# Patient Record
Sex: MALE | Employment: FULL TIME | URBAN - NONMETROPOLITAN AREA
[De-identification: names, ages, dates, MRNs, and addresses within clinical notes are randomized per-mention and may not be internally consistent; named-entity substitution may affect disease eponyms.]

---

## 2017-05-12 ENCOUNTER — OFFICE VISIT (OUTPATIENT)
Dept: URGENT CARE | Facility: PHYSICIAN GROUP | Age: 32
End: 2017-05-12
Payer: MEDICAID

## 2017-05-12 VITALS
OXYGEN SATURATION: 97 % | SYSTOLIC BLOOD PRESSURE: 120 MMHG | HEIGHT: 71 IN | RESPIRATION RATE: 14 BRPM | BODY MASS INDEX: 27.58 KG/M2 | HEART RATE: 68 BPM | TEMPERATURE: 98.4 F | WEIGHT: 197 LBS | DIASTOLIC BLOOD PRESSURE: 72 MMHG

## 2017-05-12 DIAGNOSIS — J31.0 RHINITIS, UNSPECIFIED TYPE: ICD-10-CM

## 2017-05-12 DIAGNOSIS — J06.9 URI WITH COUGH AND CONGESTION: ICD-10-CM

## 2017-05-12 DIAGNOSIS — H66.001 ACUTE SUPPURATIVE OTITIS MEDIA OF RIGHT EAR WITHOUT SPONTANEOUS RUPTURE OF TYMPANIC MEMBRANE, RECURRENCE NOT SPECIFIED: ICD-10-CM

## 2017-05-12 PROCEDURE — 99203 OFFICE O/P NEW LOW 30 MIN: CPT | Performed by: PHYSICIAN ASSISTANT

## 2017-05-12 RX ORDER — FLUTICASONE PROPIONATE 50 MCG
1 SPRAY, SUSPENSION (ML) NASAL 2 TIMES DAILY
Qty: 1 BOTTLE | Refills: 0 | Status: SHIPPED | OUTPATIENT
Start: 2017-05-12 | End: 2019-02-21

## 2017-05-12 RX ORDER — CODEINE PHOSPHATE AND GUAIFENESIN 10; 100 MG/5ML; MG/5ML
5 SOLUTION ORAL NIGHTLY PRN
Qty: 120 ML | Refills: 0 | Status: SHIPPED | OUTPATIENT
Start: 2017-05-12 | End: 2019-02-21

## 2017-05-12 RX ORDER — AMOXICILLIN AND CLAVULANATE POTASSIUM 875; 125 MG/1; MG/1
1 TABLET, FILM COATED ORAL 2 TIMES DAILY
Qty: 20 TAB | Refills: 0 | Status: SHIPPED | OUTPATIENT
Start: 2017-05-12 | End: 2019-02-21

## 2017-05-12 NOTE — PROGRESS NOTES
Chief Complaint   Patient presents with   • Ear Fullness     dizzy, cant hear       HISTORY OF PRESENT ILLNESS: Patient is a 32 y.o. male who presents today for evaluation of right ear pain. Patient reports associated intermittent dizziness and difficulty hearing out of that ear. It started 2-3 days ago. It has become progressively worse. He has not taken any of her medication. He denies any drainage. He reports a four-day history of green nasal drainage and cough. He has a difficulty sleeping at night because of the cough. He denies fever, night sweats, chills, body aches.    There are no active problems to display for this patient.      Allergies:Review of patient's allergies indicates not on file.    Current Outpatient Prescriptions Ordered in [facility]   Medication Sig Dispense Refill   • amoxicillin-clavulanate (AUGMENTIN) 875-125 MG Tab Take 1 Tab by mouth 2 times a day. 20 Tab 0   • guaifenesin-codeine (ROBITUSSIN AC) Solution oral solution Take 5 mL by mouth at bedtime as needed for Cough. 120 mL 0   • fluticasone (FLONASE) 50 MCG/ACT nasal spray Spray 1 Spray in nose 2 times a day. 1 Bottle 0     No current Epic-ordered facility-administered medications on file.       No past medical history on file.    Social History   Substance Use Topics   • Smoking status: Not on file   • Smokeless tobacco: Not on file   • Alcohol Use: Not on file       No family status information on file.   No family history on file.    ROS:   Review of Systems   Constitutional: Negative for fever, chills, weight loss and malaise/fatigue.   HENT: Negative for nosebleeds,  sore throat and neck pain.    Eyes: Negative for blurred vision.   Respiratory: Negative for sputum production, shortness of breath and wheezing.    Cardiovascular: Negative for chest pain, palpitations, orthopnea and leg swelling.   Gastrointestinal: Negative for heartburn, nausea, vomiting and abdominal pain.   Genitourinary: Negative for dysuria, urgency and frequency.  "      Exam:  Blood pressure 120/72, pulse 68, temperature 36.9 °C (98.4 °F), resp. rate 14, height 1.803 m (5' 10.98\"), weight 89.359 kg (197 lb), SpO2 97 %.  General: Normal appearing. No distress.  HEENT: Conjunctiva clear, lids without ptosis, ears normal shape and contour, nasal mucosa benign, oropharynx is without erythema, edema or exudates. Left EAC and TM are within normal limits. Right EAC is within normal limits. Right TM is markedly erythematous with purulent effusion. Unable to identify landmarks.  Pulmonary: Clear to ausculation and percussion.  Normal effort. No rales, ronchi, or wheezing.   Cardiovascular: Regular rate and rhythm without murmur.   Neurologic: Grossly nonfocal.  Lymph: No cervical lymphadenopathy noted.  Skin: No obvious lesions.  Psych: Normal mood. Alert and oriented x3. Judgment and insight is normal.    Assessment/Plan:  Take all medication as directed. Discussed the nasal congestion and cough are likely due to a virus and one likely respond antibiotics. Discussed appropriate over-the-counter symptomatic medication, and when to return to clinic. Follow for similar persistent symptoms. Patient is requesting off work until the 15th.  1. Acute suppurative otitis media of right ear without spontaneous rupture of tympanic membrane, recurrence not specified  amoxicillin-clavulanate (AUGMENTIN) 875-125 MG Tab   2. URI with cough and congestion  guaifenesin-codeine (ROBITUSSIN AC) Solution oral solution   3. Rhinitis, unspecified type  fluticasone (FLONASE) 50 MCG/ACT nasal spray         "

## 2017-05-12 NOTE — Clinical Note
May 12, 2017         Patient: Mingo Brown   YOB: 1985   Date of Visit: 5/12/2017           To Whom it May Concern:    Mingo Brown was seen in my clinic on 5/12/2017. He may return to work on 5/15/17.    If you have any questions or concerns, please don't hesitate to call.        Sincerely,           Estrellita Burgess PA-C  Electronically Signed

## 2017-05-12 NOTE — MR AVS SNAPSHOT
"        Mingo Walker Stephanie   2017 10:20 AM   Office Visit   MRN: [id number]    Department:  [facility]   Dept Phone:  [phone]    Description:  Male : 1985   Provider:  Estrellita Burgess PA-C           Reason for Visit     Ear Fullness dizzy, cant hear      Allergies as of 2017     Not on File      You were diagnosed with     Acute suppurative otitis media of right ear without spontaneous rupture of tympanic membrane, recurrence not specified   [[id number]]       URI with cough and congestion   [[id number]]       Rhinitis, unspecified type   [[id number]]         Vital Signs     Blood Pressure Pulse Temperature Respirations Height Weight    120/72 mmHg 68 36.9 °C (98.4 °F) 14 1.803 m (5' 10.98\") 89.359 kg (197 lb)    Body Mass Index Oxygen Saturation                27.49 kg/m2 97%          Basic Information     Date Of Birth Sex Race Ethnicity Preferred Language    1985 Male Unable to Obtain Unknown English      Health Maintenance     Patient has no pending health maintenance at this time      Current Immunizations     No immunizations on file.      Below and/or attached are the medications your provider expects you to take. Review all of your home medications and newly ordered medications with your provider and/or pharmacist. Follow medication instructions as directed by your provider and/or pharmacist. Please keep your medication list with you and share with your provider. Update the information when medications are discontinued, doses are changed, or new medications (including over-the-counter products) are added; and carry medication information at all times in the event of emergency situations     Allergies:  No Known Allergies          Medications  Valid as of: May 12, 2017 - 11:04 AM    Generic Name Brand Name Tablet Size Instructions for use    Amoxicillin-Pot Clavulanate (Tab) AUGMENTIN 875-125 MG Take 1 Tab by mouth 2 times a day.        Fluticasone Propionate (Suspension) " FLONASE 50 MCG/ACT Spray 1 Spray in nose 2 times a day.        Guaifenesin-Codeine (Solution) ROBITUSSIN -10 mg/5mL Take 5 mL by mouth at bedtime as needed for Cough.        .                 Medicines prescribed today were sent to:     [facility] JOSSELYN CRUZ - [address]    [address] [address]    Phone: [phone] Fax: [phone]    Open 24 Hours?: No      Medication refill instructions:       If your prescription bottle indicates you have medication refills left, it is not necessary to call your provider’s office. Please contact your pharmacy and they will refill your medication.    If your prescription bottle indicates you do not have any refills left, you may request refills at any time through one of the following ways: The online [employer] system (except Urgent Care), by calling your provider’s office, or by asking your pharmacy to contact your provider’s office with a refill request. Medication refills are processed only during regular business hours and may not be available until the next business day. Your provider may request additional information or to have a follow-up visit with you prior to refilling your medication.   *Please Note: Medication refills are assigned a new Rx number when refilled electronically. Your pharmacy may indicate that no refills were authorized even though a new prescription for the same medication is available at the pharmacy. Please request the medicine by name with the pharmacy before contacting your provider for a refill.           [employer] Access Code: [email or web address]  Expires: 6/11/2017 11:04 AM    Your email address is not on file at [facility].  Email Addresses are required for you to sign up for [employer], please contact [phone] to verify your personal information and to provide your email address prior to attempting to register for [employer].    Mingo Brown  [address] [place]  [address]    Numerous  A secure, online tool to manage your health information     [email or web address]’s Megathreadt® is a secure, online tool that connects you to your personalized health information from the privacy of your home -- day or night - making it very easy for you to manage your healthcare. Once the activation process is completed, you can even access your medical information using the Numerous prachi, which is available for free in the [employer] store or Google Play store.     To learn more about Numerous, visit [email or web address]    There are two levels of access available (as shown below):   My Chart Features  [facility] Primary Care Doctor [facility]  Specialists [facility]  Urgent  Care Non-[facility] Primary Care Doctor   Email your healthcare team securely and privately 24/7 X X X    Manage appointments: schedule your next appointment; view details of past/upcoming appointments X      Request prescription refills. X      View recent personal medical records, including lab and immunizations X X X X   View health record, including health history, allergies, medications X X X X   Read reports about your outpatient visits, procedures, consult and ER notes X X X X   See your discharge summary, which is a recap of your hospital and/or ER visit that includes your diagnosis, lab results, and care plan X X  X     How to register for Numerous:  Once your e-mail address has been verified, follow the following steps to sign up for Numerous.     1. Go to  [email or web address]  2. Click on the Sign Up Now box, which takes you to the New Member Sign Up page. You will need to provide the following information:  a. Enter your Numerous Access Code exactly as it appears at the top of this page. (You will not need to use this code after you’ve completed the sign-up process. If you do not sign up before the expiration date, you must request a new code.)   b. Enter your date of birth.   c. Enter your home email address.   d. Click Submit, and  follow the next screen’s instructions.  3. Create a ChangeTipt ID. This will be your [employer] login ID and cannot be changed, so think of one that is secure and easy to remember.  4. Create a ChangeTipt password. You can change your password at any time.  5. Enter your Password Reset Question and Answer. This can be used at a later time if you forget your password.   6. Enter your e-mail address. This allows you to receive e-mail notifications when new information is available in [employer].  7. Click Sign Up. You can now view your health information.    For assistance activating your [employer] account, call [phone]

## 2017-07-12 ENCOUNTER — OFFICE VISIT (OUTPATIENT)
Dept: URGENT CARE | Facility: PHYSICIAN GROUP | Age: 32
End: 2017-07-12
Payer: MEDICAID

## 2017-07-12 VITALS
WEIGHT: 206 LBS | SYSTOLIC BLOOD PRESSURE: 124 MMHG | HEART RATE: 76 BPM | DIASTOLIC BLOOD PRESSURE: 80 MMHG | TEMPERATURE: 97.9 F | BODY MASS INDEX: 28.84 KG/M2 | RESPIRATION RATE: 16 BRPM | OXYGEN SATURATION: 98 % | HEIGHT: 71 IN

## 2017-07-12 DIAGNOSIS — S39.012A STRAIN OF LUMBAR PARASPINOUS MUSCLE, INITIAL ENCOUNTER: ICD-10-CM

## 2017-07-12 PROCEDURE — 99214 OFFICE O/P EST MOD 30 MIN: CPT | Performed by: PHYSICIAN ASSISTANT

## 2017-07-12 RX ORDER — CYCLOBENZAPRINE HCL 10 MG
10 TABLET ORAL 3 TIMES DAILY PRN
Qty: 30 TAB | Refills: 0 | Status: SHIPPED | OUTPATIENT
Start: 2017-07-12 | End: 2019-02-21

## 2017-07-12 NOTE — MR AVS SNAPSHOT
"        Mingo Burgosnis   2017 6:10 PM   Office Visit   MRN: [id number]    Department:  [facility]   Dept Phone:  [phone]    Description:  Male : 1985   Provider:  Estrellita Burgess PA-C           Reason for Visit     Back Pain           Allergies as of 2017     No Known Allergies      You were diagnosed with     Strain of lumbar paraspinous muscle, initial encounter   [[id number]]         Vital Signs     Blood Pressure Pulse Temperature Respirations Height Weight    124/80 mmHg 76 36.6 °C (97.9 °F) 16 1.803 m (5' 10.98\") 93.441 kg (206 lb)    Body Mass Index Oxygen Saturation                28.74 kg/m2 98%          Basic Information     Date Of Birth Sex Race Ethnicity Preferred Language    1985 Male Unable to Obtain Unknown English      Health Maintenance        Date Due Completion Dates    IMM DTaP/Tdap/Td Vaccine (1 - Tdap) 2003 ---    IMM INFLUENZA (1) 2017 ---            Current Immunizations     No immunizations on file.      Below and/or attached are the medications your provider expects you to take. Review all of your home medications and newly ordered medications with your provider and/or pharmacist. Follow medication instructions as directed by your provider and/or pharmacist. Please keep your medication list with you and share with your provider. Update the information when medications are discontinued, doses are changed, or new medications (including over-the-counter products) are added; and carry medication information at all times in the event of emergency situations     Allergies:  No Known Allergies          Medications  Valid as of: 2017 -  6:56 PM    Generic Name Brand Name Tablet Size Instructions for use    Amoxicillin-Pot Clavulanate (Tab) AUGMENTIN 875-125 MG Take 1 Tab by mouth 2 times a day.        Cyclobenzaprine HCl (Tab) FLEXERIL 10 MG Take 1 Tab by mouth 3 times a day as needed for Mild Pain or Moderate Pain.        Fluticasone " Propionate (Suspension) FLONASE 50 MCG/ACT Spray 1 Spray in nose 2 times a day.        Guaifenesin-Codeine (Solution) ROBITUSSIN -10 mg/5mL Take 5 mL by mouth at bedtime as needed for Cough.        .                 Medicines prescribed today were sent to:     [facility] [address] [place] [address], [place] - [address]    [address] [facility] [place] [address]    Phone: [phone] Fax: [phone]    Open 24 Hours?: No      Medication refill instructions:       If your prescription bottle indicates you have medication refills left, it is not necessary to call your provider’s office. Please contact your pharmacy and they will refill your medication.    If your prescription bottle indicates you do not have any refills left, you may request refills at any time through one of the following ways: The online [employer] system (except Urgent Care), by calling your provider’s office, or by asking your pharmacy to contact your provider’s office with a refill request. Medication refills are processed only during regular business hours and may not be available until the next business day. Your provider may request additional information or to have a follow-up visit with you prior to refilling your medication.   *Please Note: Medication refills are assigned a new Rx number when refilled electronically. Your pharmacy may indicate that no refills were authorized even though a new prescription for the same medication is available at the pharmacy. Please request the medicine by name with the pharmacy before contacting your provider for a refill.           [place] Status: Patient Declined

## 2017-07-12 NOTE — Clinical Note
July 12, 2017         Patient: Mingo Brown   YOB: 1985   Date of Visit: 7/12/2017           To Whom it May Concern:    Mingo Brown was seen in my clinic on 7/12/2017. He may return to work on 7/14/17.    If you have any questions or concerns, please don't hesitate to call.        Sincerely,           Estrellita Burgess PA-C  Electronically Signed

## 2017-07-13 NOTE — PROGRESS NOTES
Chief Complaint   Patient presents with   • Back Pain       HISTORY OF PRESENT ILLNESS: Patient is a 32 y.o. male who presents today for evaluation of right-sided low back pain that started today. Patient was helping his dad demo a kitchen. He was pulling on some countertop expecting his feet dependent but they did not. He felt immediate pain in his right low back, radiating down to his right buttock and hip. He states the pain is worse when he goes from a seated to standing position when he tries to stand back up straight. He complains of pain radiating down his right buttock to approximately mid thigh. He denies saddle anesthesia and lower extremity weakness. He has not had any bowel or bladder incontinence. He denies any history of the same symptoms. He has not taken any over-the-counter medication as it just happened.    There are no active problems to display for this patient.      Allergies:Review of patient's allergies indicates no known allergies.    Current Outpatient Prescriptions Ordered in [facility]   Medication Sig Dispense Refill   • cyclobenzaprine (FLEXERIL) 10 MG Tab Take 1 Tab by mouth 3 times a day as needed for Mild Pain or Moderate Pain. 30 Tab 0   • amoxicillin-clavulanate (AUGMENTIN) 875-125 MG Tab Take 1 Tab by mouth 2 times a day. 20 Tab 0   • guaifenesin-codeine (ROBITUSSIN AC) Solution oral solution Take 5 mL by mouth at bedtime as needed for Cough. 120 mL 0   • fluticasone (FLONASE) 50 MCG/ACT nasal spray Spray 1 Spray in nose 2 times a day. 1 Bottle 0     No current Epic-ordered facility-administered medications on file.       No past medical history on file.    Social History   Substance Use Topics   • Smoking status: Not on file   • Smokeless tobacco: Not on file   • Alcohol Use: Not on file       No family status information on file.   No family history on file.    ROS:   Review of Systems   Constitutional: Negative for fever, chills, weight loss and malaise/fatigue.   HENT: Negative for  "ear pain, nosebleeds, congestion, sore throat and neck pain.    Eyes: Negative for blurred vision.   Respiratory: Negative for cough, sputum production, shortness of breath and wheezing.    Cardiovascular: Negative for chest pain, palpitations, orthopnea and leg swelling.   Gastrointestinal: Negative for heartburn, nausea, vomiting and abdominal pain.   Genitourinary: Negative for dysuria, urgency and frequency.       Exam:  Blood pressure 124/80, pulse 76, temperature 36.6 °C (97.9 °F), resp. rate 16, height 1.803 m (5' 10.98\"), weight 93.441 kg (206 lb), SpO2 98 %.  General: Normal appearing. No distress.  HEENT: Head is grossly normal.  Pulmonary: No respiratory distress noted.  Back: Decreased range of motion due to pain. No localized tenderness noted.  Neurologic: No sensory deficit noted.  Extremities: No motor deficit noted. Prepatellar DTRs are strong and equal bilaterally.  Skin: No obvious lesions.  Psych: Normal mood. Alert and oriented x3. Judgment and insight is normal.    Assessment/Plan:  Take all medication as directed. Apply heat to the affected area. Discussed over-the-counter muscle rubs and pain patches. Follow-up for worsening or persistent symptoms.  1. Strain of lumbar paraspinous muscle, initial encounter  cyclobenzaprine (FLEXERIL) 10 MG Tab         "

## 2018-12-04 ENCOUNTER — OFFICE VISIT (OUTPATIENT)
Dept: URGENT CARE | Facility: PHYSICIAN GROUP | Age: 33
End: 2018-12-04

## 2018-12-04 VITALS
DIASTOLIC BLOOD PRESSURE: 70 MMHG | WEIGHT: 199 LBS | HEIGHT: 71 IN | BODY MASS INDEX: 27.86 KG/M2 | OXYGEN SATURATION: 97 % | SYSTOLIC BLOOD PRESSURE: 122 MMHG | HEART RATE: 71 BPM | TEMPERATURE: 97.5 F

## 2018-12-04 DIAGNOSIS — S05.8X2A CORNEAL INJURY OF LEFT EYE, INITIAL ENCOUNTER: ICD-10-CM

## 2018-12-04 PROCEDURE — 99214 OFFICE O/P EST MOD 30 MIN: CPT | Performed by: PHYSICIAN ASSISTANT

## 2018-12-04 NOTE — LETTER
December 4, 2018         Patient: Mingo Brown   YOB: 1985   Date of Visit: 12/4/2018           To Whom it May Concern:    Mingo Brown was seen in my clinic on 12/4/2018.  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 He will follow up with optometry on 12/10/18 for full clearance.     If you have any questions or concerns, please don't hesitate to call.        Sincerely,           Camille Serrano P.A.-C.  Electronically Signed

## 2018-12-07 ASSESSMENT — ENCOUNTER SYMPTOMS
NAUSEA: 0
FOREIGN BODY SENSATION: 0
VOMITING: 0
DIARRHEA: 0
SHORTNESS OF BREATH: 0
CHILLS: 0
EYE DISCHARGE: 0
FEVER: 0
BLURRED VISION: 0
ABDOMINAL PAIN: 0
DIZZINESS: 0
PHOTOPHOBIA: 1
DOUBLE VISION: 0
EYE PAIN: 1
MUSCULOSKELETAL NEGATIVE: 1
EYE REDNESS: 1
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
## 2019-02-21 ENCOUNTER — OFFICE VISIT (OUTPATIENT)
Dept: URGENT CARE | Facility: PHYSICIAN GROUP | Age: 34
End: 2019-02-21
Payer: COMMERCIAL

## 2019-02-21 ENCOUNTER — APPOINTMENT (OUTPATIENT)
Dept: RADIOLOGY | Facility: IMAGING CENTER | Age: 34
End: 2019-02-21
Attending: PHYSICIAN ASSISTANT
Payer: COMMERCIAL

## 2019-02-21 VITALS
SYSTOLIC BLOOD PRESSURE: 132 MMHG | OXYGEN SATURATION: 98 % | TEMPERATURE: 98.3 F | HEART RATE: 70 BPM | DIASTOLIC BLOOD PRESSURE: 80 MMHG | RESPIRATION RATE: 16 BRPM | BODY MASS INDEX: 26.36 KG/M2 | WEIGHT: 189 LBS
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
## 2019-02-21 DIAGNOSIS — S39.012A ACUTE MYOFASCIAL STRAIN OF LUMBOSACRAL REGION, INITIAL ENCOUNTER: ICD-10-CM

## 2019-02-21 DIAGNOSIS — S29.019A THORACIC MYOFASCIAL STRAIN, INITIAL ENCOUNTER: ICD-10-CM

## 2019-02-21 DIAGNOSIS — R07.81 RIB PAIN ON RIGHT SIDE: ICD-10-CM

## 2019-02-21 DIAGNOSIS — S13.4XXA WHIPLASH INJURIES, INITIAL ENCOUNTER: ICD-10-CM

## 2019-02-21 DIAGNOSIS — S13.4XXA WHIPLASH INJURIES, INITIAL ENCOUNTER: Primary | ICD-10-CM

## 2019-02-21 PROCEDURE — 71101 X-RAY EXAM UNILAT RIBS/CHEST: CPT | Mod: TC,FY,RT | Performed by: PHYSICIAN ASSISTANT

## 2019-02-21 PROCEDURE — 72070 X-RAY EXAM THORAC SPINE 2VWS: CPT | Mod: 26 | Performed by: PHYSICIAN ASSISTANT

## 2019-02-21 PROCEDURE — 72100 X-RAY EXAM L-S SPINE 2/3 VWS: CPT | Mod: TC,FY | Performed by: PHYSICIAN ASSISTANT

## 2019-02-21 PROCEDURE — 99214 OFFICE O/P EST MOD 30 MIN: CPT | Performed by: PHYSICIAN ASSISTANT

## 2019-02-21 RX ORDER — TRAMADOL HYDROCHLORIDE 50 MG/1
50-100 TABLET ORAL EVERY 4 HOURS PRN
Qty: 30 TAB | Refills: 0 | Status: SHIPPED | OUTPATIENT
Start: 2019-02-21 | End: 2019-02-26

## 2019-02-21 RX ORDER — CYCLOBENZAPRINE HCL 5 MG
5-10 TABLET ORAL 3 TIMES DAILY PRN
Qty: 30 TAB | Refills: 0 | Status: SHIPPED | OUTPATIENT
Start: 2019-02-21 | End: 2019-09-06

## 2019-02-21 RX ORDER — METHYLPREDNISOLONE 4 MG/1
TABLET ORAL
Qty: 21 TAB | Refills: 0 | Status: SHIPPED | OUTPATIENT
Start: 2019-02-21 | End: 2019-09-06

## 2019-02-21 NOTE — LETTER
February 21, 2019       Patient: Mingo Brown   YOB: 1985   Date of Visit: 2/21/2019         To Whom It May Concern:    It is my medical opinion that Mingo Brown may be excused from work for the dates of 2/21/19-2/25/19.      If you have any questions or concerns, please don't hesitate to call [phone]          Sincerely,          Quintin Jenkins P.A.-C.  Electronically Signed
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
## 2019-02-26 ENCOUNTER — OFFICE VISIT (OUTPATIENT)
Dept: URGENT CARE | Facility: PHYSICIAN GROUP | Age: 34
End: 2019-02-26
Payer: COMMERCIAL

## 2019-02-26 VITALS
TEMPERATURE: 98.2 F | DIASTOLIC BLOOD PRESSURE: 70 MMHG | RESPIRATION RATE: 16 BRPM | OXYGEN SATURATION: 94 % | SYSTOLIC BLOOD PRESSURE: 124 MMHG | WEIGHT: 189 LBS | HEART RATE: 55 BPM | BODY MASS INDEX: 26.36 KG/M2

## 2019-02-26 DIAGNOSIS — S39.012D ACUTE MYOFASCIAL STRAIN OF LUMBOSACRAL REGION, SUBSEQUENT ENCOUNTER: ICD-10-CM

## 2019-02-26 DIAGNOSIS — R51.9 NONINTRACTABLE HEADACHE, UNSPECIFIED CHRONICITY PATTERN, UNSPECIFIED HEADACHE TYPE: ICD-10-CM

## 2019-02-26 DIAGNOSIS — S13.4XXD WHIPLASH INJURIES, SUBSEQUENT ENCOUNTER: ICD-10-CM
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
## 2019-02-26 DIAGNOSIS — S29.019D THORACIC MYOFASCIAL STRAIN, SUBSEQUENT ENCOUNTER: ICD-10-CM

## 2019-02-26 PROCEDURE — 99214 OFFICE O/P EST MOD 30 MIN: CPT | Performed by: FAMILY MEDICINE

## 2019-02-26 RX ORDER — AMOXICILLIN 500 MG/1
CAPSULE ORAL
COMMUNITY
Start: 2019-01-16 | End: 2019-09-06

## 2019-02-26 RX ORDER — KETOROLAC TROMETHAMINE 10 MG/1
TABLET, FILM COATED ORAL
Qty: 20 TAB | Refills: 0 | Status: SHIPPED | OUTPATIENT
Start: 2019-02-26 | End: 2019-09-06
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
## 2019-02-26 NOTE — LETTER
February 26, 2019         Patient: Mingo Brown   YOB: 1985   Date of Visit: 2/26/2019           To Whom it May Concern:    Mingo Brown was seen in my clinic on 2/26/2019.     Please excuse from work for 2/26/19 thru and including 3/2/19 due to medical condition.    May return sooner if feeling better.    Please allow him to do light duty upon return to work if needed.    If you have any questions or concerns, please don't hesitate to call.        Sincerely,           Jose Altamirano M.D.  Electronically Signed

## 2019-04-04 ENCOUNTER — TELEPHONE (OUTPATIENT)
Dept: SCHEDULING | Facility: IMAGING CENTER | Age: 34
End: 2019-04-04

## 2019-08-23 ENCOUNTER — TELEPHONE (OUTPATIENT)
Dept: SCHEDULING | Facility: IMAGING CENTER | Age: 34
End: 2019-08-23

## 2019-09-06 ENCOUNTER — OFFICE VISIT (OUTPATIENT)
Dept: MEDICAL GROUP | Facility: CLINIC | Age: 34
End: 2019-09-06
Payer: COMMERCIAL

## 2019-09-06 VITALS
OXYGEN SATURATION: 95 % | HEART RATE: 69 BPM | BODY MASS INDEX: 27.77 KG/M2 | RESPIRATION RATE: 14 BRPM | SYSTOLIC BLOOD PRESSURE: 146 MMHG | TEMPERATURE: 98.2 F | DIASTOLIC BLOOD PRESSURE: 90 MMHG | WEIGHT: 198.4 LBS | HEIGHT: 71 IN

## 2019-09-06 DIAGNOSIS — R06.02 SHORTNESS OF BREATH: ICD-10-CM

## 2019-09-06 DIAGNOSIS — L72.0 CYST OF SKIN AND SUBCUTANEOUS TISSUE: ICD-10-CM

## 2019-09-06 PROCEDURE — 11401 EXC TR-EXT B9+MARG 0.6-1 CM: CPT | Performed by: FAMILY MEDICINE

## 2019-09-06 PROCEDURE — 99213 OFFICE O/P EST LOW 20 MIN: CPT | Mod: 25 | Performed by: FAMILY MEDICINE

## 2019-09-06 RX ORDER — HYDROCODONE BITARTRATE AND ACETAMINOPHEN 10; 325 MG/1; MG/1
1 TABLET ORAL EVERY 6 HOURS PRN
Refills: 0 | COMMUNITY
Start: 2019-06-27 | End: 2019-09-06

## 2019-09-06 RX ORDER — IBUPROFEN 800 MG/1
800 TABLET ORAL EVERY 6 HOURS PRN
Refills: 0 | COMMUNITY
Start: 2019-06-27 | End: 2019-09-06

## 2019-09-06 SDOH — HEALTH STABILITY: MENTAL HEALTH: HOW MANY STANDARD DRINKS CONTAINING ALCOHOL DO YOU HAVE ON A TYPICAL DAY?: 1 OR 2

## 2019-09-06 SDOH — HEALTH STABILITY: MENTAL HEALTH: HOW OFTEN DO YOU HAVE 6 OR MORE DRINKS ON ONE OCCASION?: NEVER

## 2019-09-06 SDOH — HEALTH STABILITY: MENTAL HEALTH: HOW OFTEN DO YOU HAVE A DRINK CONTAINING ALCOHOL?: NEVER

## 2019-09-06 ASSESSMENT — PATIENT HEALTH QUESTIONNAIRE - PHQ9: CLINICAL INTERPRETATION OF PHQ2 SCORE: 0

## 2019-09-06 NOTE — ASSESSMENT & PLAN NOTE
Has been experiencing shortness of breath at different occasion: at rest, during exercise and at rest. Cough. Chest tightness. Gave up vaping after [place] pneumonitis scare. Has smoked since he was 15. Denies wheezing. Sxs more when he is worried, anxious.

## 2019-09-06 NOTE — PROGRESS NOTES
Complaint: Establish care     Subjective:     Mingo Brown is a 34 y.o. male here today accompanied by his wife.    Shortness of breath  Has been experiencing shortness of breath at different occasion: at rest, during exercise and at rest. Cough. Chest tightness. Gave up vaping pot after [place] pneumonitis scare. Has smoked since he was 15. Denies wheezing. Sxs more when he is worried, anxious.    Cyst of skin and subcutaneous tissue  For many years has hard tender lump right upper back, wife says it is getting bigger.      , , 1 child. 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    Current medicines (including changes today)  No current outpatient medications on file.     No current facility-administered medications for this visit.      He  has no past medical history on file.    Health Maintenance:       Allergies: Patient has no known allergies.    No current [facility]-ordered outpatient medications on file.     No current [facility]-ordered facility-administered medications on file.        History reviewed. No pertinent past medical history.    History reviewed. No pertinent surgical history.    Social History     Tobacco Use   • Smoking status: Current Every Day Smoker     Packs/day: 0.50     Types: Cigarettes     Start date: 9/6/2002   • Smokeless tobacco: Never Used   Substance Use Topics   • Alcohol use: Yes     Frequency: Never     Drinks per session: 1 or 2     Binge frequency: Never     Comment: rarely   • Drug use: Not Currently     Types: Marijuana, Inhaled     Comment: was vaping        Social History     Social History Narrative   • Not on file       Family History   Problem Relation Age of Onset   • Mult Sclerosis Mother    • Gout Father          ROS Positive for cyst on back, sporadic bouts of shortness of breath, occasional heartburn, anxiety.  Patient denies any fever, chills, unintentional weight gain/loss, fatigue, stroke symptoms, dizziness, headache, nasal congestion, sore-throat, cough, chest  "pain,  abdominal discomfort, diarrhea/constipation, burning with urination or frequency, joint or back pain, skin rashes, depression.       Objective:     /90 (BP Location: Left arm, Patient Position: Sitting, BP Cuff Size: Adult)   Pulse 69   Temp 36.8 °C (98.2 °F) (Temporal)   Resp 14   Ht 1.803 m (5' 11\")   Wt 90 kg (198 lb 6.4 oz)   SpO2 95%  Body mass index is 27.67 kg/m².   Physical Exam:  Constitutional: Alert, no distress.  Skin: Warm, dry, good turgor, no rashes in visible areas. 1 cm diameter symmetric lump right upper back, fluctuating.  Eye: Equal, round and reactive, conjunctiva clear, lids normal.  ENMT: Lips without lesions, good dentition, oropharynx clear.  Neck: Trachea midline, no masses, no thyromegaly. No cervical or supraclavicular lymphadenopathy  Respiratory: Unlabored respiratory effort, lungs clear to auscultation, no wheezes, no ronchi.  Cardiovascular: Normal S1, S2, no murmur, no extremity edema.  Abdomen: Soft, non-tender, no masses, no hepatosplenomegaly.  Psych: Alert and oriented x3, appropriate affect and mood.    PF: 650 x 2 (wnl for age and height)    Assessment and Plan:   The following treatment plan was discussed    1. Shortness of breath  Probably due to anxiety. To reassure, will get pulm consult with PFTs. Recommend breathing exercises in meantime.  - REFERRAL TO PULMONOLOGY    2. Cyst of skin and subcutaneous tissue  Wishes to have removed.    Procedure: Skin disinfected with Betadine. LA with 1% xylocaine plus epinephrine. 7 mm vertical incision with scalpel, Sebaceous material expressed, sac removed in toto. Skin closure with one suture 3.0 Ethilon. AB ointment, dressing.     Usual wound care instructions reviewed. Suture removal 1 week.      Followup: Return if symptoms worsen or fail to improve.    Please note that this dictation was created using voice recognition software. I have made every reasonable attempt to correct obvious errors, but I expect that " there are errors of grammar and possibly content that I did not discover before finalizing the note.